# Patient Record
Sex: MALE | Race: WHITE | Employment: FULL TIME | URBAN - METROPOLITAN AREA
[De-identification: names, ages, dates, MRNs, and addresses within clinical notes are randomized per-mention and may not be internally consistent; named-entity substitution may affect disease eponyms.]

---

## 2017-05-11 ENCOUNTER — TELEPHONE (OUTPATIENT)
Dept: INTERNAL MEDICINE CLINIC | Facility: CLINIC | Age: 32
End: 2017-05-11

## 2017-05-11 NOTE — TELEPHONE ENCOUNTER
Patient had shoulder surgery on 1-17-17 and since that time has changed insurance to [employer] as of 4/1/17. Patient states that he needs to get approval for physical therapy at [facility] and [facility] Matthew Shetty, [address] [place] [address]).   Please advise

## 2017-05-16 NOTE — TELEPHONE ENCOUNTER
MORELIA  S/w pt, he said his surgeon is from [facility], and he just saw him and the specialist ordered additional PT. We discussed the role of the specialist, to get referral from the specialist, since it is his order, his codes for treatment.   Pt verbs un

## 2017-05-23 ENCOUNTER — TELEPHONE (OUTPATIENT)
Dept: INTERNAL MEDICINE CLINIC | Facility: CLINIC | Age: 32
End: 2017-05-23

## 2017-05-23 NOTE — TELEPHONE ENCOUNTER
Faxed back to [facility] order and referral needs to come from surgion as previously discussed with PT.

## 2017-06-20 ENCOUNTER — TELEPHONE (OUTPATIENT)
Dept: INTERNAL MEDICINE CLINIC | Facility: CLINIC | Age: 32
End: 2017-06-20

## 2017-06-20 NOTE — TELEPHONE ENCOUNTER
Per Lito Rosario @ [address]# [phone] pt was seen earlier this year for physical therapy but had other ins, As of 4/1/17 pt's insurance switched to [facility].  Pt is still needing physical therapy under his new insurance but [facility] is not in network s

## 2017-06-27 NOTE — TELEPHONE ENCOUNTER
Pt called back. He will call his ins co to check on his out of network benefits for physical therapy with [facility]. He will call us back to inform us if he wants to stay with [facility] or come in to network.

## 2019-01-03 ENCOUNTER — WALK IN (OUTPATIENT)
Dept: URGENT CARE | Age: 34
End: 2019-01-03

## 2019-01-03 VITALS
RESPIRATION RATE: 16 BRPM | DIASTOLIC BLOOD PRESSURE: 76 MMHG | HEIGHT: 70 IN | WEIGHT: 173.06 LBS | HEART RATE: 64 BPM | BODY MASS INDEX: 24.78 KG/M2 | SYSTOLIC BLOOD PRESSURE: 104 MMHG | TEMPERATURE: 98.6 F | OXYGEN SATURATION: 98 %

## 2019-01-03 DIAGNOSIS — H66.90 ACUTE OTITIS MEDIA, UNSPECIFIED OTITIS MEDIA TYPE: Primary | ICD-10-CM

## 2019-01-03 DIAGNOSIS — H10.33 ACUTE BACTERIAL CONJUNCTIVITIS OF BOTH EYES: ICD-10-CM

## 2019-01-03 LAB
INTERNAL PROCEDURAL CONTROLS ACCEPTABLE: NO
S PYO AG THROAT QL IA.RAPID: NEGATIVE

## 2019-01-03 PROCEDURE — 99204 OFFICE O/P NEW MOD 45 MIN: CPT | Performed by: NURSE PRACTITIONER

## 2019-01-03 PROCEDURE — 87880 STREP A ASSAY W/OPTIC: CPT | Performed by: NURSE PRACTITIONER

## 2019-01-03 RX ORDER — TOBRAMYCIN 3 MG/ML
1 SOLUTION/ DROPS OPHTHALMIC EVERY 6 HOURS
Qty: 5 ML | Refills: 0 | Status: SHIPPED | OUTPATIENT
Start: 2019-01-03 | End: 2019-01-08

## 2019-01-03 RX ORDER — AMOXICILLIN 875 MG/1
875 TABLET, COATED ORAL 2 TIMES DAILY
Qty: 20 TABLET | Refills: 0 | Status: SHIPPED | OUTPATIENT
Start: 2019-01-03 | End: 2019-01-13

## 2019-01-03 RX ORDER — AMOXICILLIN 875 MG/1
875 TABLET, COATED ORAL 2 TIMES DAILY
Qty: 20 TABLET | Refills: 0 | Status: SHIPPED | OUTPATIENT
Start: 2019-01-03 | End: 2019-01-03

## 2019-01-03 ASSESSMENT — ENCOUNTER SYMPTOMS
EYE DISCHARGE: 1
EYE REDNESS: 1
SINUS PRESSURE: 0
SHORTNESS OF BREATH: 0
VOMITING: 0
SINUS PAIN: 0
SORE THROAT: 1
RHINORRHEA: 1
EYE PAIN: 0
FEVER: 0
CHILLS: 0
NAUSEA: 0
COUGH: 1
EYE ITCHING: 1

## 2019-03-23 ENCOUNTER — TELEPHONE (OUTPATIENT)
Dept: INTERNAL MEDICINE CLINIC | Facility: CLINIC | Age: 34
End: 2019-03-23

## 2019-03-24 ENCOUNTER — WALK IN (OUTPATIENT)
Dept: URGENT CARE | Age: 34
End: 2019-03-24

## 2019-03-24 ENCOUNTER — TELEPHONE (OUTPATIENT)
Dept: SCHEDULING | Age: 34
End: 2019-03-24

## 2019-03-24 VITALS
HEART RATE: 56 BPM | RESPIRATION RATE: 18 BRPM | TEMPERATURE: 97.7 F | DIASTOLIC BLOOD PRESSURE: 80 MMHG | SYSTOLIC BLOOD PRESSURE: 114 MMHG

## 2019-03-24 DIAGNOSIS — L08.9 SKIN INFECTION: Primary | ICD-10-CM

## 2019-03-24 PROCEDURE — 99214 OFFICE O/P EST MOD 30 MIN: CPT | Performed by: NURSE PRACTITIONER

## 2019-03-24 RX ORDER — SULFAMETHOXAZOLE AND TRIMETHOPRIM 800; 160 MG/1; MG/1
1 TABLET ORAL 2 TIMES DAILY
Qty: 10 TABLET | Refills: 0 | Status: SHIPPED | OUTPATIENT
Start: 2019-03-24 | End: 2019-03-29

## 2019-03-24 ASSESSMENT — ENCOUNTER SYMPTOMS
CONSTITUTIONAL NEGATIVE: 1
RESPIRATORY NEGATIVE: 1
ROS SKIN COMMENTS: PER HPI

## 2019-04-08 ENCOUNTER — TELEPHONE (OUTPATIENT)
Dept: INTERNAL MEDICINE CLINIC | Facility: CLINIC | Age: 34
End: 2019-04-08

## 2019-04-08 DIAGNOSIS — Z13.220 SCREENING FOR LIPOID DISORDERS: ICD-10-CM

## 2019-04-08 DIAGNOSIS — Z13.228 SCREENING FOR METABOLIC DISORDER: ICD-10-CM

## 2019-04-08 DIAGNOSIS — Z13.29 SCREENING FOR THYROID DISORDER: ICD-10-CM

## 2019-04-08 DIAGNOSIS — Z00.00 ROUTINE GENERAL MEDICAL EXAMINATION AT A HEALTH CARE FACILITY: Primary | ICD-10-CM

## 2019-04-08 DIAGNOSIS — Z13.0 SCREENING FOR DISORDER OF BLOOD AND BLOOD-FORMING ORGANS: ICD-10-CM

## 2019-04-08 NOTE — TELEPHONE ENCOUNTER
CPE   Future Appointments   Date Time Provider Nicole Mcdaniel   4/23/2019  7:30 AM JORGE L Mayfield [facility]     Orders to [facility] aware must fast no call back required

## 2019-04-08 NOTE — TELEPHONE ENCOUNTER
Future Appointments   Date Time Provider Nicole Mcdaniel   4/23/2019  7:30 AM JORGE L Mack [facility]

## 2019-04-24 ENCOUNTER — OFFICE VISIT (OUTPATIENT)
Dept: INTERNAL MEDICINE CLINIC | Facility: CLINIC | Age: 34
End: 2019-04-24
Payer: COMMERCIAL

## 2019-04-24 ENCOUNTER — LAB ENCOUNTER (OUTPATIENT)
Dept: LAB | Age: 34
End: 2019-04-24
Attending: NURSE PRACTITIONER
Payer: COMMERCIAL

## 2019-04-24 VITALS
HEIGHT: 70 IN | DIASTOLIC BLOOD PRESSURE: 70 MMHG | SYSTOLIC BLOOD PRESSURE: 116 MMHG | WEIGHT: 182 LBS | HEART RATE: 56 BPM | BODY MASS INDEX: 26.05 KG/M2 | TEMPERATURE: 98 F | RESPIRATION RATE: 16 BRPM

## 2019-04-24 DIAGNOSIS — Z13.220 SCREENING FOR LIPOID DISORDERS: ICD-10-CM

## 2019-04-24 DIAGNOSIS — N50.819 PERSISTENT TESTICULAR PAIN: ICD-10-CM

## 2019-04-24 DIAGNOSIS — Z13.0 SCREENING FOR DISORDER OF BLOOD AND BLOOD-FORMING ORGANS: ICD-10-CM

## 2019-04-24 DIAGNOSIS — Z13.29 SCREENING FOR THYROID DISORDER: ICD-10-CM

## 2019-04-24 DIAGNOSIS — Z13.228 SCREENING FOR METABOLIC DISORDER: ICD-10-CM

## 2019-04-24 DIAGNOSIS — Z00.00 ROUTINE GENERAL MEDICAL EXAMINATION AT A HEALTH CARE FACILITY: Primary | ICD-10-CM

## 2019-04-24 DIAGNOSIS — Z00.00 ROUTINE GENERAL MEDICAL EXAMINATION AT A HEALTH CARE FACILITY: ICD-10-CM

## 2019-04-24 PROCEDURE — 99385 PREV VISIT NEW AGE 18-39: CPT | Performed by: NURSE PRACTITIONER

## 2019-04-24 PROCEDURE — 80061 LIPID PANEL: CPT | Performed by: NURSE PRACTITIONER

## 2019-04-24 PROCEDURE — 80050 GENERAL HEALTH PANEL: CPT | Performed by: NURSE PRACTITIONER

## 2019-04-24 NOTE — PROGRESS NOTES
Sissy Grande is a 34year old male who presents for a complete physical exam.     HPI:   Pt complains of    Here to re establish  Last ov Feb 2016. Doing well     C/o intermittent testicular pain. No risk of STD.   Pain intermittent ache like  Does exerc Dental Visits:yes   Colonoscopy:  na  PSA:  na      REVIEW OF SYSTEMS:   GENERAL: feels well otherwise  SKIN: denies any unusual skin lesions  EYES:denies blurred vision or double vision  HEENT: denies nasal congestion, sinus pain or ear discomfort  LUNG Consults:  US SCROTUM W/ DOPPLER (CPT=93975/01871)  US GROIN BILATERAL (FVT=[id number])    No follow-ups on file. There are no Patient Instructions on file for this visit.

## 2019-04-25 DIAGNOSIS — R79.89 ELEVATED SERUM CREATININE: Primary | ICD-10-CM

## 2019-08-06 ENCOUNTER — TELEPHONE (OUTPATIENT)
Dept: INTERNAL MEDICINE CLINIC | Facility: CLINIC | Age: 34
End: 2019-08-06

## 2019-08-06 NOTE — TELEPHONE ENCOUNTER
Spoke with patient informed per SD should have ov to fully discussed STD testing and HSV, transmission and testing. Patient verbalized understanding and agreeable to POC. Scheduled with SD this Saturday-8/10/2019.

## 2019-08-06 NOTE — TELEPHONE ENCOUNTER
[facility]-4/92/9034 with SD  Spoke with patient stating does not have any symptoms at this time, but has been exposed. Patient had unprotected sex with girlfriend of 9 months. Girlfriend informed him she has a history of genital herpes 4-5 years ago.  Please advi

## 2019-08-10 ENCOUNTER — OFFICE VISIT (OUTPATIENT)
Dept: INTERNAL MEDICINE CLINIC | Facility: CLINIC | Age: 34
End: 2019-08-10
Payer: COMMERCIAL

## 2019-08-10 VITALS
HEART RATE: 72 BPM | WEIGHT: 180 LBS | HEIGHT: 70 IN | BODY MASS INDEX: 25.77 KG/M2 | SYSTOLIC BLOOD PRESSURE: 128 MMHG | TEMPERATURE: 99 F | DIASTOLIC BLOOD PRESSURE: 80 MMHG

## 2019-08-10 DIAGNOSIS — J06.9 ACUTE URI: Primary | ICD-10-CM

## 2019-08-10 DIAGNOSIS — Z71.1 CONCERN ABOUT STD IN MALE WITHOUT DIAGNOSIS: ICD-10-CM

## 2019-08-10 PROCEDURE — 99213 OFFICE O/P EST LOW 20 MIN: CPT | Performed by: NURSE PRACTITIONER

## 2019-08-10 RX ORDER — AZITHROMYCIN 250 MG/1
TABLET, FILM COATED ORAL
Qty: 6 TABLET | Refills: 0 | Status: SHIPPED | OUTPATIENT
Start: 2019-08-10 | End: 2019-08-26

## 2019-08-10 NOTE — PROGRESS NOTES
Nelson Singh is a 34year old male. Patient presents with:  STD: LG. Room 11.  His girlfriend has a history of gential herpes and would like to get tested   Sore Throat: Sore throat for about 1 week with some swelling      HPI:   Here for discussion of ST Pulse 72   Temp 98.7 °F (37.1 °C) (Oral)   Ht 70\"   Wt 180 lb   BMI 25.83 kg/m²   GENERAL: well developed, well nourished,in no apparent distress  HEENT: atraumatic, normocephalic,ears and throat are clear  Pharyngeal erythema without exudate.    NECK: bryant

## 2019-08-12 ENCOUNTER — TELEPHONE (OUTPATIENT)
Dept: INTERNAL MEDICINE CLINIC | Facility: CLINIC | Age: 34
End: 2019-08-12

## 2019-08-12 NOTE — TELEPHONE ENCOUNTER
Spoke with patient states was seen on Saturday with SD for URI was prescribed z-giuliano, is taking ibuprofen, now has left ear pain when swallowing or talking, slight SOB, no fevers, no chills, no nausea or vomiting or any other symptom at this time.  Per AD ok

## 2019-08-12 NOTE — TELEPHONE ENCOUNTER
Pt saw SD on Saturday for uri=was given rx for azithromycin (ZITHROMAX Z-LEXI) 250 MG Oral Tab-pt called today and said it is really hard to swallow due to sore throat and pain in L ear-lymph node on neck still same

## 2019-08-14 ENCOUNTER — HOSPITAL (OUTPATIENT)
Dept: OTHER | Age: 34
End: 2019-08-14

## 2019-08-14 ENCOUNTER — DIAGNOSTIC TRANS (OUTPATIENT)
Dept: OTHER | Age: 34
End: 2019-08-14

## 2019-08-14 LAB
ANALYZER ANC (IANC): ABNORMAL
ANION GAP SERPL CALC-SCNC: 11 MMOL/L (ref 10–20)
BASOPHILS # BLD: 0 K/MCL (ref 0–0.3)
BASOPHILS NFR BLD: 0 %
BUN SERPL-MCNC: 17 MG/DL (ref 6–20)
BUN/CREAT SERPL: 17 (ref 7–25)
CALCIUM SERPL-MCNC: 9.2 MG/DL (ref 8.4–10.2)
CHLORIDE SERPL-SCNC: 110 MMOL/L (ref 98–107)
CO2 SERPL-SCNC: 27 MMOL/L (ref 21–32)
CREAT SERPL-MCNC: 1.02 MG/DL (ref 0.67–1.17)
DIFFERENTIAL METHOD BLD: ABNORMAL
EOSINOPHIL # BLD: 0 K/MCL (ref 0.1–0.5)
EOSINOPHIL NFR BLD: 0 %
ERYTHROCYTE [DISTWIDTH] IN BLOOD: 12.4 % (ref 11–15)
GLUCOSE SERPL-MCNC: 108 MG/DL (ref 65–99)
HCT VFR BLD CALC: 44.7 % (ref 39–51)
HETEROPH AB SERPL QL IA: POSITIVE
HGB BLD-MCNC: 15.3 G/DL (ref 13–17)
LYMPHOCYTES # BLD: 9.1 K/MCL (ref 1–4.8)
LYMPHOCYTES NFR BLD: 40 %
MCH RBC QN AUTO: 30.6 PG (ref 26–34)
MCHC RBC AUTO-ENTMCNC: 34.2 G/DL (ref 32–36.5)
MCV RBC AUTO: 89.4 FL (ref 78–100)
MONOCYTES # BLD: 1.1 K/MCL (ref 0.3–0.9)
MONOCYTES NFR BLD: 7 %
NEUTROPHILS # BLD: 5.3 K/MCL (ref 1.8–7.7)
NEUTS BAND NFR BLD: 1 % (ref 0–10)
NEUTS SEG NFR BLD: 33 %
NRBC (NRBCRE): 0 /100 WBC
PATH REV BLD -IMP: ABNORMAL
PLAT MORPH BLD: NORMAL
PLATELET # BLD: 147 K/MCL (ref 140–450)
POTASSIUM SERPL-SCNC: 3.7 MMOL/L (ref 3.4–5.1)
RBC # BLD: 5 MIL/MCL (ref 4.5–5.9)
RBC MORPH BLD: NORMAL
SODIUM SERPL-SCNC: 144 MMOL/L (ref 135–145)
TROPONIN I SERPL HS-MCNC: <0.02 NG/ML
VARIANT LYMPHS NFR BLD: 19 % (ref 0–5)
WBC # BLD: 15.5 K/MCL (ref 4.2–11)
WBC MORPH BLD: ABNORMAL

## 2019-08-14 PROCEDURE — 99221 1ST HOSP IP/OBS SF/LOW 40: CPT | Performed by: HOSPITALIST

## 2019-08-15 ENCOUNTER — HOSPITAL (OUTPATIENT)
Dept: OTHER | Age: 34
End: 2019-08-15

## 2019-08-15 ENCOUNTER — MED REC SCAN ONLY (OUTPATIENT)
Dept: INTERNAL MEDICINE CLINIC | Facility: CLINIC | Age: 34
End: 2019-08-15

## 2019-08-15 LAB
ALBUMIN SERPL-MCNC: 3.4 G/DL (ref 3.6–5.1)
ALBUMIN SMEAR (DALB): ABNORMAL
ALP SERPL-CCNC: 147 UNITS/L (ref 45–117)
ALT SERPL-CCNC: 126 UNITS/L
ANALYZER ANC (IANC): ABNORMAL
ANION GAP SERPL CALC-SCNC: 12 MMOL/L (ref 10–20)
AST SERPL-CCNC: 38 UNITS/L
BASOPHILS # BLD: 0.2 K/MCL (ref 0–0.3)
BASOPHILS NFR BLD: 1 %
BILIRUB CONJ SERPL-MCNC: 0.1 MG/DL (ref 0–0.2)
BILIRUB SERPL-MCNC: 0.3 MG/DL (ref 0.2–1)
BUN SERPL-MCNC: 14 MG/DL (ref 6–20)
BUN/CREAT SERPL: 17 (ref 7–25)
CALCIUM SERPL-MCNC: 9.4 MG/DL (ref 8.4–10.2)
CHLORIDE SERPL-SCNC: 107 MMOL/L (ref 98–107)
CO2 SERPL-SCNC: 27 MMOL/L (ref 21–32)
CREAT SERPL-MCNC: 0.84 MG/DL (ref 0.67–1.17)
DIFFERENTIAL METHOD BLD: ABNORMAL
EOSINOPHIL # BLD: 0 K/MCL (ref 0.1–0.5)
EOSINOPHIL NFR BLD: 0 %
ERYTHROCYTE [DISTWIDTH] IN BLOOD: 12.3 % (ref 11–15)
GLUCOSE SERPL-MCNC: 173 MG/DL (ref 65–99)
GROUP A STREP THROAT PCR (PCRGAS): NORMAL
GROUP A STREP THROAT PCR (PCRGAS): NOT DETECTED
HCT VFR BLD CALC: 46.6 % (ref 39–51)
HGB BLD-MCNC: 15.7 G/DL (ref 13–17)
LYMPH ABN NFR BLD: 39 %
LYMPHOCYTES # BLD: 8.2 K/MCL (ref 1–4.8)
LYMPHOCYTES NFR BLD: 14 %
MAGNESIUM SERPL-MCNC: 2.1 MG/DL (ref 1.7–2.4)
MAGNESIUM SERPL-MCNC: NORMAL MG/DL
MCH RBC QN AUTO: 30.6 PG (ref 26–34)
MCHC RBC AUTO-ENTMCNC: 33.7 G/DL (ref 32–36.5)
MCV RBC AUTO: 90.8 FL (ref 78–100)
MONOCYTES # BLD: 0.2 K/MCL (ref 0.3–0.9)
MONOCYTES NFR BLD: 1 %
MRSA DNA SPEC QL NAA+PROBE: NORMAL
MRSA DNA SPEC QL NAA+PROBE: NORMAL
MRSA DNA SPEC QL NAA+PROBE: NOT DETECTED
NEUTROPHILS # BLD: 6.9 K/MCL (ref 1.8–7.7)
NEUTS BAND NFR BLD: 1 % (ref 0–10)
NEUTS SEG NFR BLD: 44 %
NRBC (NRBCRE): 0 /100 WBC
PATH REV BLD -IMP: ABNORMAL
PATH REV BLD -IMP: NORMAL
PATHOLOGIST NAME: NORMAL
PATHOLOGIST NAME: NORMAL
PHOSPHATE SERPL-MCNC: 2.9 MG/DL (ref 2.4–4.7)
PLAT MORPH BLD: NORMAL
PLATELET # BLD: 174 K/MCL (ref 140–450)
POTASSIUM SERPL-SCNC: 4.7 MMOL/L (ref 3.4–5.1)
POTASSIUM SERPL-SCNC: ABNORMAL MMOL/L
PROT SERPL-MCNC: 7.6 G/DL (ref 6.4–8.2)
RBC # BLD: 5.13 MIL/MCL (ref 4.5–5.9)
RBC MORPH BLD: NORMAL
SODIUM SERPL-SCNC: 141 MMOL/L (ref 135–145)
SPECIMEN SOURCE: NORMAL
TROPONIN I SERPL HS-MCNC: <0.02 NG/ML
TROPONIN I SERPL HS-MCNC: <0.02 NG/ML
WBC # BLD: 15.4 K/MCL (ref 4.2–11)
WBC MORPH BLD: ABNORMAL

## 2019-08-15 PROCEDURE — 99233 SBSQ HOSP IP/OBS HIGH 50: CPT | Performed by: INTERNAL MEDICINE

## 2019-08-16 LAB
FLOW SPECIMEN SOURCE (FLWSRC): NORMAL
FLOW SPECIMEN TYPE (FLWTYP): NORMAL
HERPES VIRUS TYPE 1 (HSVPT1): NOT DETECTED
HERPES VIRUS TYPE 2 (HSVPT2): NORMAL
HERPES VIRUS TYPE 2 (HSVPT2): NOT DETECTED
SERVICE CMNT-IMP: NORMAL
SPECIMEN SOURCE: NORMAL

## 2019-08-16 PROCEDURE — 99238 HOSP IP/OBS DSCHRG MGMT 30/<: CPT | Performed by: INTERNAL MEDICINE

## 2019-08-17 LAB
EBV DNA # BLD NAA+PROBE: 4050 COPIES/ML
EBV DNA SPEC NAA+PROBE-LOG#: 3.61 COPIES/ML
EBV DNA SPEC NAA+PROBE-LOG#: ABNORMAL {LOG_COPIES}/ML

## 2019-08-19 ENCOUNTER — TELEPHONE (OUTPATIENT)
Dept: INTERNAL MEDICINE CLINIC | Facility: CLINIC | Age: 34
End: 2019-08-19

## 2019-08-19 NOTE — TELEPHONE ENCOUNTER
Received CT of neck, reviewed by SD, has he seen ENT?  Spoke with patient indicates was at [facility] last week ended up staying overnight, was diagnosed with Mono-pt advised to rest, increase fluid intake, ibuprofen or tylenol, and heathy diet

## 2019-08-21 ENCOUNTER — HOSPITAL (OUTPATIENT)
Dept: OTHER | Age: 34
End: 2019-08-21
Attending: INTERNAL MEDICINE

## 2019-08-22 LAB
CMV DNA # BLD NAA+PROBE: NOT DETECTED IUNITS/ML
CMV DNA SERPL NAA+PROBE-LOG#: NORMAL {LOG_COPIES}/ML
CMV DNA SERPL NAA+PROBE-LOG#: NOT DETECTED IUNITS/ML
SPECIMEN SOURCE: NORMAL

## 2019-08-26 ENCOUNTER — OFFICE VISIT (OUTPATIENT)
Dept: INTERNAL MEDICINE CLINIC | Facility: CLINIC | Age: 34
End: 2019-08-26
Payer: COMMERCIAL

## 2019-08-26 ENCOUNTER — APPOINTMENT (OUTPATIENT)
Dept: LAB | Age: 34
End: 2019-08-26
Attending: INTERNAL MEDICINE
Payer: COMMERCIAL

## 2019-08-26 VITALS
TEMPERATURE: 98 F | HEIGHT: 70 IN | SYSTOLIC BLOOD PRESSURE: 118 MMHG | DIASTOLIC BLOOD PRESSURE: 70 MMHG | RESPIRATION RATE: 16 BRPM | WEIGHT: 177.19 LBS | HEART RATE: 76 BPM | BODY MASS INDEX: 25.37 KG/M2

## 2019-08-26 DIAGNOSIS — B27.90 MONONUCLEOSIS SYNDROME: ICD-10-CM

## 2019-08-26 DIAGNOSIS — R79.89 ELEVATED SERUM CREATININE: ICD-10-CM

## 2019-08-26 DIAGNOSIS — B27.90 INFECTIOUS MONONUCLEOSIS WITHOUT COMPLICATION, INFECTIOUS MONONUCLEOSIS DUE TO UNSPECIFIED ORGANISM: Primary | ICD-10-CM

## 2019-08-26 LAB
ALBUMIN SERPL-MCNC: 3.8 G/DL (ref 3.4–5)
ALBUMIN/GLOB SERPL: 1 {RATIO} (ref 1–2)
ALP LIVER SERPL-CCNC: 123 U/L (ref 45–117)
ALT SERPL-CCNC: 216 U/L (ref 16–61)
ANION GAP SERPL CALC-SCNC: 5 MMOL/L (ref 0–18)
AST SERPL-CCNC: 96 U/L (ref 15–37)
BILIRUB SERPL-MCNC: 0.4 MG/DL (ref 0.1–2)
BUN BLD-MCNC: 24 MG/DL (ref 7–18)
BUN/CREAT SERPL: 19.8 (ref 10–20)
CALCIUM BLD-MCNC: 9.3 MG/DL (ref 8.5–10.1)
CHLORIDE SERPL-SCNC: 106 MMOL/L (ref 98–112)
CO2 SERPL-SCNC: 27 MMOL/L (ref 21–32)
CREAT BLD-MCNC: 1.21 MG/DL (ref 0.7–1.3)
GLOBULIN PLAS-MCNC: 3.9 G/DL (ref 2.8–4.4)
GLUCOSE BLD-MCNC: 90 MG/DL (ref 70–99)
M PROTEIN MFR SERPL ELPH: 7.7 G/DL (ref 6.4–8.2)
OSMOLALITY SERPL CALC.SUM OF ELEC: 290 MOSM/KG (ref 275–295)
POTASSIUM SERPL-SCNC: 4.9 MMOL/L (ref 3.5–5.1)
SODIUM SERPL-SCNC: 138 MMOL/L (ref 136–145)

## 2019-08-26 PROCEDURE — 80053 COMPREHEN METABOLIC PANEL: CPT

## 2019-08-26 PROCEDURE — 99213 OFFICE O/P EST LOW 20 MIN: CPT | Performed by: INTERNAL MEDICINE

## 2019-08-26 NOTE — PROGRESS NOTES
Krystle Prescott is a 34year old male. Patient presents with: Follow - Up:  room 3: follow upto [facility] visit       HPI:     Patient here for f/u-  3 weeks ago, he started with sore throat, severe fatigue, swollen glands.  Given zpak but it did not supple,no adenopathy  LUNGS: normal rate without respiratory distress, lungs clear to auscultation  CARDIO: RRR nl S1 S2  GI: normal bowel sounds, soft, NT/ND  EXTREMITIES: no cyanosis, clubbing or edema  NEURO: Alert and oriented, no focal deficits    ASS

## 2019-08-27 ENCOUNTER — TELEPHONE (OUTPATIENT)
Dept: INTERNAL MEDICINE CLINIC | Facility: CLINIC | Age: 34
End: 2019-08-27

## 2019-08-27 DIAGNOSIS — R79.89 ELEVATED LFTS: Primary | ICD-10-CM

## 2019-09-05 LAB
FLOW REPORT: NORMAL
FLOW REPORT: NORMAL

## 2021-01-14 ENCOUNTER — LAB ENCOUNTER (OUTPATIENT)
Dept: LAB | Facility: HOSPITAL | Age: 36
End: 2021-01-14
Attending: INTERNAL MEDICINE
Payer: COMMERCIAL

## 2021-01-14 DIAGNOSIS — R19.4 ALTERED BOWEL HABITS: ICD-10-CM

## 2021-01-14 DIAGNOSIS — K90.41 GLUTEN INTOLERANCE: ICD-10-CM

## 2021-01-14 LAB
ALBUMIN SERPL-MCNC: 4.1 G/DL (ref 3.4–5)
ALBUMIN/GLOB SERPL: 1.3 {RATIO} (ref 1–2)
ALP LIVER SERPL-CCNC: 85 U/L
ALT SERPL-CCNC: 37 U/L
ANION GAP SERPL CALC-SCNC: 2 MMOL/L (ref 0–18)
AST SERPL-CCNC: 27 U/L (ref 15–37)
BILIRUB SERPL-MCNC: 0.3 MG/DL (ref 0.1–2)
BUN BLD-MCNC: 18 MG/DL (ref 7–18)
BUN/CREAT SERPL: 14.3 (ref 10–20)
CALCIUM BLD-MCNC: 9.5 MG/DL (ref 8.5–10.1)
CHLORIDE SERPL-SCNC: 109 MMOL/L (ref 98–112)
CO2 SERPL-SCNC: 29 MMOL/L (ref 21–32)
CREAT BLD-MCNC: 1.26 MG/DL
GLOBULIN PLAS-MCNC: 3.1 G/DL (ref 2.8–4.4)
GLUCOSE BLD-MCNC: 92 MG/DL (ref 70–99)
IGA SERPL-MCNC: 228 MG/DL (ref 70–312)
M PROTEIN MFR SERPL ELPH: 7.2 G/DL (ref 6.4–8.2)
OSMOLALITY SERPL CALC.SUM OF ELEC: 292 MOSM/KG (ref 275–295)
PATIENT FASTING Y/N/NP: NO
POTASSIUM SERPL-SCNC: 4.1 MMOL/L (ref 3.5–5.1)
SODIUM SERPL-SCNC: 140 MMOL/L (ref 136–145)
TSI SER-ACNC: 1.25 MIU/ML (ref 0.36–3.74)

## 2021-01-14 PROCEDURE — 83516 IMMUNOASSAY NONANTIBODY: CPT

## 2021-01-14 PROCEDURE — 36415 COLL VENOUS BLD VENIPUNCTURE: CPT

## 2021-01-14 PROCEDURE — 84443 ASSAY THYROID STIM HORMONE: CPT

## 2021-01-14 PROCEDURE — 80053 COMPREHEN METABOLIC PANEL: CPT

## 2021-01-14 PROCEDURE — 82784 ASSAY IGA/IGD/IGG/IGM EACH: CPT

## 2021-01-14 NOTE — PROGRESS NOTES
Donis Cardoso,    Your labs look great! Still waiting on 1 lab for celiac disease but your liver tests are back to normal.  I will keep you posted on the last lab. Hope you are doing well and symptoms are controlled on a gluten free diet.

## 2021-01-20 ENCOUNTER — LAB ENCOUNTER (OUTPATIENT)
Dept: LAB | Facility: HOSPITAL | Age: 36
End: 2021-01-20
Attending: INTERNAL MEDICINE
Payer: COMMERCIAL

## 2021-01-20 PROCEDURE — 83516 IMMUNOASSAY NONANTIBODY: CPT

## 2021-01-20 PROCEDURE — 36415 COLL VENOUS BLD VENIPUNCTURE: CPT

## 2021-01-22 LAB — TTG IGA SER-ACNC: 0.4 U/ML (ref ?–7)

## 2021-01-22 NOTE — PROGRESS NOTES
Khanh Galvez,    The repeat screening test for Celiac Disease is negative. Let me know how things go on the strict gluten free diet.

## 2021-01-22 NOTE — PROGRESS NOTES
MD Released to my chart with provider's instructions     Pt.  Active on [facility]

## 2021-04-28 ENCOUNTER — IMMUNIZATION (OUTPATIENT)
Dept: LAB | Facility: HOSPITAL | Age: 36
End: 2021-04-28
Attending: EMERGENCY MEDICINE
Payer: COMMERCIAL

## 2021-04-28 DIAGNOSIS — Z23 NEED FOR VACCINATION: Primary | ICD-10-CM

## 2021-04-28 PROCEDURE — 0011A SARSCOV2 VAC 100MCG/0.5ML IM: CPT

## 2021-05-26 ENCOUNTER — IMMUNIZATION (OUTPATIENT)
Dept: LAB | Facility: HOSPITAL | Age: 36
End: 2021-05-26
Attending: EMERGENCY MEDICINE
Payer: COMMERCIAL

## 2021-05-26 DIAGNOSIS — Z23 NEED FOR VACCINATION: Primary | ICD-10-CM

## 2021-05-26 PROCEDURE — 0012A SARSCOV2 VAC 100MCG/0.5ML IM: CPT

## 2021-12-03 ENCOUNTER — TELEPHONE (OUTPATIENT)
Dept: INTERNAL MEDICINE CLINIC | Facility: CLINIC | Age: 36
End: 2021-12-03

## 2021-12-03 DIAGNOSIS — Z13.0 SCREENING FOR BLOOD DISEASE: ICD-10-CM

## 2021-12-03 DIAGNOSIS — Z00.00 ROUTINE GENERAL MEDICAL EXAMINATION AT A HEALTH CARE FACILITY: ICD-10-CM

## 2021-12-03 DIAGNOSIS — Z13.228 SCREENING FOR METABOLIC DISORDER: ICD-10-CM

## 2021-12-03 DIAGNOSIS — Z13.220 SCREENING FOR LIPID DISORDERS: Primary | ICD-10-CM

## 2021-12-03 DIAGNOSIS — Z13.29 SCREENING FOR THYROID DISORDER: ICD-10-CM

## 2021-12-03 NOTE — TELEPHONE ENCOUNTER
Future Appointments   Date Time Provider Nicole Mcdaniel   2/9/2022  9:00 AM Lynne Avery MD [facility]     Orders to edward- Pt informed that labs need to be completed no sooner than 2 weeks prior to the appt.  Pt aware to fast-no call back

## 2022-01-21 ENCOUNTER — IMMUNIZATION (OUTPATIENT)
Dept: LAB | Facility: HOSPITAL | Age: 37
End: 2022-01-21
Attending: EMERGENCY MEDICINE
Payer: COMMERCIAL

## 2022-01-21 DIAGNOSIS — Z23 NEED FOR VACCINATION: Primary | ICD-10-CM

## 2022-01-21 PROCEDURE — 0064A SARSCOV2 VAC 50MCG/0.25ML IM: CPT | Performed by: PHYSICIAN ASSISTANT

## 2022-02-04 ENCOUNTER — LAB ENCOUNTER (OUTPATIENT)
Dept: LAB | Facility: HOSPITAL | Age: 37
End: 2022-02-04
Attending: INTERNAL MEDICINE
Payer: COMMERCIAL

## 2022-02-04 DIAGNOSIS — Z13.228 SCREENING FOR METABOLIC DISORDER: ICD-10-CM

## 2022-02-04 DIAGNOSIS — Z13.220 SCREENING FOR LIPID DISORDERS: ICD-10-CM

## 2022-02-04 DIAGNOSIS — Z13.29 SCREENING FOR THYROID DISORDER: ICD-10-CM

## 2022-02-04 DIAGNOSIS — Z13.0 SCREENING FOR BLOOD DISEASE: ICD-10-CM

## 2022-02-04 DIAGNOSIS — Z00.00 ROUTINE GENERAL MEDICAL EXAMINATION AT A HEALTH CARE FACILITY: ICD-10-CM

## 2022-02-04 LAB
ALBUMIN SERPL-MCNC: 4 G/DL (ref 3.4–5)
ALP LIVER SERPL-CCNC: 85 U/L
ALT SERPL-CCNC: 30 U/L
ANION GAP SERPL CALC-SCNC: 6 MMOL/L (ref 0–18)
AST SERPL-CCNC: 21 U/L (ref 15–37)
BASOPHILS # BLD AUTO: 0.03 X10(3) UL (ref 0–0.2)
BASOPHILS NFR BLD AUTO: 0.7 %
BILIRUB SERPL-MCNC: 0.6 MG/DL (ref 0.1–2)
BUN BLD-MCNC: 20 MG/DL (ref 7–18)
BUN/CREAT SERPL: 15.2 (ref 10–20)
CALCIUM BLD-MCNC: 9.7 MG/DL (ref 8.5–10.1)
CHLORIDE SERPL-SCNC: 108 MMOL/L (ref 98–112)
CHOLEST SERPL-MCNC: 185 MG/DL (ref ?–200)
CO2 SERPL-SCNC: 27 MMOL/L (ref 21–32)
CREAT BLD-MCNC: 1.32 MG/DL
DEPRECATED RDW RBC AUTO: 41.6 FL (ref 35.1–46.3)
EOSINOPHIL # BLD AUTO: 0.1 X10(3) UL (ref 0–0.7)
EOSINOPHIL NFR BLD AUTO: 2.4 %
ERYTHROCYTE [DISTWIDTH] IN BLOOD BY AUTOMATED COUNT: 12.1 % (ref 11–15)
FASTING STATUS PATIENT QL REPORTED: YES
GLOBULIN PLAS-MCNC: 3.1 G/DL (ref 2.8–4.4)
GLUCOSE BLD-MCNC: 92 MG/DL (ref 70–99)
HDLC SERPL-MCNC: 37 MG/DL (ref 40–59)
HGB BLD-MCNC: 16 G/DL
IMM GRANULOCYTES # BLD AUTO: 0 X10(3) UL (ref 0–1)
IMM GRANULOCYTES NFR BLD: 0 %
LYMPHOCYTES # BLD AUTO: 1.39 X10(3) UL (ref 1–4)
LYMPHOCYTES NFR BLD AUTO: 33.3 %
MCH RBC QN AUTO: 30.5 PG (ref 26–34)
MCHC RBC AUTO-ENTMCNC: 32.8 G/DL (ref 31–37)
MONOCYTES # BLD AUTO: 0.39 X10(3) UL (ref 0.1–1)
MONOCYTES NFR BLD AUTO: 9.4 %
NEUTROPHILS # BLD AUTO: 2.26 X10 (3) UL (ref 1.5–7.7)
NEUTROPHILS # BLD AUTO: 2.26 X10(3) UL (ref 1.5–7.7)
NEUTROPHILS NFR BLD AUTO: 54.2 %
NONHDLC SERPL-MCNC: 148 MG/DL (ref ?–130)
OSMOLALITY SERPL CALC.SUM OF ELEC: 294 MOSM/KG (ref 275–295)
PLATELET # BLD AUTO: 160 10(3)UL (ref 150–450)
POTASSIUM SERPL-SCNC: 5.3 MMOL/L (ref 3.5–5.1)
RBC # BLD AUTO: 5.24 X10(6)UL
SODIUM SERPL-SCNC: 141 MMOL/L (ref 136–145)
TRIGL SERPL-MCNC: 112 MG/DL (ref 30–149)
TSI SER-ACNC: 1.9 MIU/ML (ref 0.36–3.74)
VLDLC SERPL CALC-MCNC: 20 MG/DL (ref 0–30)
WBC # BLD AUTO: 4.2 X10(3) UL (ref 4–11)

## 2022-02-04 PROCEDURE — 84443 ASSAY THYROID STIM HORMONE: CPT

## 2022-02-04 PROCEDURE — 85025 COMPLETE CBC W/AUTO DIFF WBC: CPT

## 2022-02-04 PROCEDURE — 36415 COLL VENOUS BLD VENIPUNCTURE: CPT

## 2022-02-04 PROCEDURE — 80053 COMPREHEN METABOLIC PANEL: CPT

## 2022-02-04 PROCEDURE — 80061 LIPID PANEL: CPT

## 2022-03-08 ENCOUNTER — OFFICE VISIT (OUTPATIENT)
Dept: INTERNAL MEDICINE CLINIC | Facility: CLINIC | Age: 37
End: 2022-03-08
Payer: COMMERCIAL

## 2022-03-08 VITALS
SYSTOLIC BLOOD PRESSURE: 110 MMHG | HEART RATE: 52 BPM | DIASTOLIC BLOOD PRESSURE: 60 MMHG | HEIGHT: 70 IN | WEIGHT: 201 LBS | TEMPERATURE: 97 F | BODY MASS INDEX: 28.77 KG/M2

## 2022-03-08 DIAGNOSIS — F41.9 ANXIETY: ICD-10-CM

## 2022-03-08 DIAGNOSIS — N28.9 RENAL INSUFFICIENCY: ICD-10-CM

## 2022-03-08 DIAGNOSIS — Z00.00 ROUTINE GENERAL MEDICAL EXAMINATION AT A HEALTH CARE FACILITY: Primary | ICD-10-CM

## 2022-03-08 PROBLEM — B27.90 INFECTIOUS MONONUCLEOSIS WITHOUT COMPLICATION: Status: RESOLVED | Noted: 2019-08-26 | Resolved: 2022-03-08

## 2022-03-08 PROCEDURE — 3074F SYST BP LT 130 MM HG: CPT | Performed by: NURSE PRACTITIONER

## 2022-03-08 PROCEDURE — 99395 PREV VISIT EST AGE 18-39: CPT | Performed by: NURSE PRACTITIONER

## 2022-03-08 PROCEDURE — 3078F DIAST BP <80 MM HG: CPT | Performed by: NURSE PRACTITIONER

## 2022-03-08 PROCEDURE — 3008F BODY MASS INDEX DOCD: CPT | Performed by: NURSE PRACTITIONER

## 2022-03-08 RX ORDER — FLUOXETINE HYDROCHLORIDE 20 MG/1
20 CAPSULE ORAL DAILY
COMMUNITY
Start: 2022-02-28 | End: 2022-03-08

## 2022-03-08 RX ORDER — FLUOXETINE HYDROCHLORIDE 20 MG/1
20 CAPSULE ORAL DAILY
Qty: 90 CAPSULE | Refills: 3 | Status: SHIPPED | OUTPATIENT
Start: 2022-03-08 | End: 2022-03-30

## 2022-03-28 ENCOUNTER — PATIENT MESSAGE (OUTPATIENT)
Dept: INTERNAL MEDICINE CLINIC | Facility: CLINIC | Age: 37
End: 2022-03-28

## 2022-03-30 RX ORDER — FLUOXETINE HYDROCHLORIDE 20 MG/1
20 CAPSULE ORAL DAILY
Qty: 90 CAPSULE | Refills: 0 | Status: SHIPPED | OUTPATIENT
Start: 2022-03-30

## 2022-03-30 NOTE — TELEPHONE ENCOUNTER
RX sent to a local [facility] in [facility] 3/8/22    90 day pended if you are agreeable and pt can resume refills when back in town from other pharmacy.

## 2022-07-09 RX ORDER — FLUOXETINE HYDROCHLORIDE 20 MG/1
20 CAPSULE ORAL DAILY
Qty: 90 CAPSULE | Refills: 3 | Status: SHIPPED | OUTPATIENT
Start: 2022-07-09 | End: 2023-09-11

## 2023-09-09 ENCOUNTER — TELEPHONE (OUTPATIENT)
Dept: INTERNAL MEDICINE CLINIC | Facility: CLINIC | Age: 38
End: 2023-09-09

## 2023-09-11 RX ORDER — FLUOXETINE HYDROCHLORIDE 20 MG/1
20 CAPSULE ORAL DAILY
Qty: 30 CAPSULE | Refills: 0 | Status: SHIPPED | OUTPATIENT
Start: 2023-09-11

## 2023-09-11 NOTE — TELEPHONE ENCOUNTER
Requested Prescriptions     Pending Prescriptions Disp Refills    FLUOXETINE 20 MG Oral Cap [Pharmacy Med Name: FLUOXETINE HCL 20 MG CAPSULE] 90 capsule 0     Sig: TAKE 1 CAPSULE BY MOUTH EVERY DAY       LOV: 3-8-2022-sd-physical    LAST CPE:OVERDUE    Last Labs: 2-4-2022-lipid,cbc,cmp    Last Refill: 7-9-2022--90 caps with 3 refills     Front: please call patient to schedule appointment.

## 2023-09-11 NOTE — TELEPHONE ENCOUNTER
Please call patient  overdue for CPE  last 3/22. Pharmacy is [id number] [facility]. Is he still our patient. 30 days sent until clarified.

## 2023-09-14 NOTE — TELEPHONE ENCOUNTER
JAVIER to schedule and clarify if he is still our pt RX request is coming from [facility]  [employer] message sent

## 2023-09-26 NOTE — TELEPHONE ENCOUNTER
Spoke with pt he stated he moved to [facility] and appreciated SD filling his meds until now. He is going to find a new doctor there and will call back if he needs any refills to hold him to his new pt appt. He is hoping to find someone in the next week or 2.      Remove PCP/care team

## 2023-10-06 ENCOUNTER — PATIENT OUTREACH (OUTPATIENT)
Dept: CASE MANAGEMENT | Age: 38
End: 2023-10-06

## 2023-10-06 NOTE — PROCEDURES
The office order for PCP removal request is Approved and finalized on October 6, 2023.     Thanks,  [facility] [employer]

## 2023-10-17 RX ORDER — FLUOXETINE HYDROCHLORIDE 20 MG/1
20 CAPSULE ORAL DAILY
Qty: 30 CAPSULE | Refills: 0 | OUTPATIENT
Start: 2023-10-17

## 2023-10-17 NOTE — TELEPHONE ENCOUNTER
Requested Prescriptions     Pending Prescriptions Disp Refills    FLUOXETINE 20 MG Oral Cap [Pharmacy Med Name: FLUOXETINE HCL 20 MG CAPSULE] 30 capsule 0     Sig: TAKE 1 CAPSULE BY MOUTH EVERY DAY       LOV: 3-8-2022-sd-physical     LAST CPE:  3-8-2022-sd-physical     Last Labs:  2-4-2022-cmp,lipid,tsh,cbc    Last Refill:  9--- 30 caps with 0 refills

## (undated) NOTE — LETTER
05/24/19        [address] [place] [address]      Dear Yo Bah,    [facility] records indicate that you have outstanding lab work and or testing that was ordered for you and has not yet been completed:  Orders Placed This Enc

## (undated) NOTE — LETTER
06/28/19        [address] [place] [address]      Dear Concepcion Quintero,    [facility] records indicate that you have outstanding lab work and or testing that was ordered for you and has not yet been completed:  Orders Placed This Enc

## (undated) NOTE — LETTER
05/28/19        [place] [address]      Dear Viktoriya Echevarria,    [facility] records indicate that you have outstanding lab work and or testing that was ordered for you and has not yet been completed:  Orders Placed This Enc